# Patient Record
Sex: MALE | URBAN - METROPOLITAN AREA
[De-identification: names, ages, dates, MRNs, and addresses within clinical notes are randomized per-mention and may not be internally consistent; named-entity substitution may affect disease eponyms.]

---

## 2018-08-28 ENCOUNTER — APPOINTMENT (OUTPATIENT)
Age: 53
Setting detail: DERMATOLOGY
End: 2018-09-11

## 2018-08-28 DIAGNOSIS — L81.4 OTHER MELANIN HYPERPIGMENTATION: ICD-10-CM

## 2018-08-28 DIAGNOSIS — Z71.89 OTHER SPECIFIED COUNSELING: ICD-10-CM

## 2018-08-28 DIAGNOSIS — L91.8 OTHER HYPERTROPHIC DISORDERS OF THE SKIN: ICD-10-CM

## 2018-08-28 DIAGNOSIS — L82.1 OTHER SEBORRHEIC KERATOSIS: ICD-10-CM

## 2018-08-28 DIAGNOSIS — D22 MELANOCYTIC NEVI: ICD-10-CM

## 2018-08-28 DIAGNOSIS — D485 NEOPLASM OF UNCERTAIN BEHAVIOR OF SKIN: ICD-10-CM

## 2018-08-28 PROBLEM — D22.4 MELANOCYTIC NEVI OF SCALP AND NECK: Status: ACTIVE | Noted: 2018-08-28

## 2018-08-28 PROBLEM — D48.5 NEOPLASM OF UNCERTAIN BEHAVIOR OF SKIN: Status: ACTIVE | Noted: 2018-08-28

## 2018-08-28 PROBLEM — D22.5 MELANOCYTIC NEVI OF TRUNK: Status: ACTIVE | Noted: 2018-08-28

## 2018-08-28 PROCEDURE — OTHER OBSERVATION: OTHER

## 2018-08-28 PROCEDURE — OTHER REASSURANCE: OTHER

## 2018-08-28 PROCEDURE — 11101: CPT

## 2018-08-28 PROCEDURE — 11100: CPT

## 2018-08-28 PROCEDURE — OTHER MIPS QUALITY: OTHER

## 2018-08-28 PROCEDURE — OTHER BIOPSY BY SHAVE METHOD: OTHER

## 2018-08-28 PROCEDURE — OTHER COUNSELING: OTHER

## 2018-08-28 PROCEDURE — 99203 OFFICE O/P NEW LOW 30 MIN: CPT | Mod: 25

## 2018-08-28 ASSESSMENT — LOCATION DETAILED DESCRIPTION DERM
LOCATION DETAILED: RIGHT RIB CAGE
LOCATION DETAILED: LEFT SUPERIOR UPPER BACK
LOCATION DETAILED: LEFT MEDIAL SUPERIOR CHEST
LOCATION DETAILED: LEFT POSTERIOR SHOULDER
LOCATION DETAILED: RIGHT INFERIOR UPPER BACK
LOCATION DETAILED: LEFT INFERIOR MEDIAL UPPER BACK
LOCATION DETAILED: LEFT MID-UPPER BACK
LOCATION DETAILED: LEFT SUPERIOR LATERAL MIDBACK
LOCATION DETAILED: RIGHT MEDIAL INFERIOR CHEST
LOCATION DETAILED: RIGHT MID-UPPER BACK
LOCATION DETAILED: RIGHT INFERIOR POSTERIOR NECK
LOCATION DETAILED: LEFT INFERIOR LATERAL LOWER BACK
LOCATION DETAILED: RIGHT SUPERIOR LATERAL MIDBACK
LOCATION DETAILED: RIGHT SUPERIOR LATERAL LOWER BACK
LOCATION DETAILED: RIGHT SUPERIOR LATERAL UPPER BACK
LOCATION DETAILED: LEFT ANTERIOR MEDIAL PROXIMAL UPPER ARM
LOCATION DETAILED: RIGHT POSTERIOR SHOULDER
LOCATION DETAILED: LEFT RIB CAGE

## 2018-08-28 ASSESSMENT — LOCATION SIMPLE DESCRIPTION DERM
LOCATION SIMPLE: RIGHT LOWER BACK
LOCATION SIMPLE: CHEST
LOCATION SIMPLE: LEFT SHOULDER
LOCATION SIMPLE: RIGHT UPPER BACK
LOCATION SIMPLE: POSTERIOR NECK
LOCATION SIMPLE: RIGHT SHOULDER
LOCATION SIMPLE: LEFT UPPER ARM
LOCATION SIMPLE: ABDOMEN
LOCATION SIMPLE: LEFT UPPER BACK
LOCATION SIMPLE: LEFT LOWER BACK

## 2018-08-28 ASSESSMENT — LOCATION ZONE DERM
LOCATION ZONE: NECK
LOCATION ZONE: TRUNK
LOCATION ZONE: ARM

## 2018-08-28 NOTE — PROCEDURE: BIOPSY BY SHAVE METHOD
Curettage Text: The wound bed was treated with curettage after the biopsy was performed.
Dressing: bandage
Detail Level: Detailed
Depth Of Biopsy: dermis
Biopsy Type: H and E
Bill For Surgical Tray: no
Hemostasis: Aluminum Chloride
Silver Nitrate Text: The wound bed was treated with silver nitrate after the biopsy was performed.
Anesthesia Volume In Cc (Will Not Render If 0): 0.5
Was A Bandage Applied: Yes
Additional Anesthesia Volume In Cc (Will Not Render If 0): 0
Electrodesiccation Text: The wound bed was treated with electrodesiccation after the biopsy was performed.
Billing Type: Third-Party Bill
Type Of Destruction Used: Curettage
Wound Care: Petrolatum
Notification Instructions: Patient will be notified of biopsy results. However, patient instructed to call the office if not contacted within 2 weeks.
Electrodesiccation And Curettage Text: The wound bed was treated with electrodesiccation and curettage after the biopsy was performed.
Cryotherapy Text: The wound bed was treated with cryotherapy after the biopsy was performed.
Consent: Written consent was obtained and risks were reviewed including but not limited to scarring, infection, bleeding, scabbing, incomplete removal, nerve damage and allergy to anesthesia.
Post-Care Instructions: I reviewed with the patient in detail post-care instructions. Patient is to keep the biopsy site dry overnight, and then apply bacitracin twice daily until healed. Patient may apply hydrogen peroxide soaks to remove any crusting.
Biopsy Method: Dermablade
Anesthesia Type: 1% lidocaine with 1:100,000 epinephrine

## 2018-08-28 NOTE — PROCEDURE: MIPS QUALITY
Name And Contact Information For Health Care Proxy: Nickie Bills [phone] Name And Contact Information For Health Care Proxy: Nickie Bills [phone]

## 2024-02-16 ENCOUNTER — NEW PATIENT (OUTPATIENT)
Dept: URBAN - METROPOLITAN AREA CLINIC 98 | Facility: CLINIC | Age: 59
End: 2024-02-16

## 2024-02-16 DIAGNOSIS — H35.721: ICD-10-CM

## 2024-02-16 DIAGNOSIS — H52.13: ICD-10-CM

## 2024-02-16 PROCEDURE — 92134 CPTRZ OPH DX IMG PST SGM RTA: CPT

## 2024-02-16 PROCEDURE — 92202 OPSCPY EXTND ON/MAC DRAW: CPT

## 2024-02-16 PROCEDURE — 99204 OFFICE O/P NEW MOD 45 MIN: CPT

## 2024-02-16 ASSESSMENT — VISUAL ACUITY
OS_CC: 20/25-1
OD_CC: 20/30+2
OD_SC: CF 3FT
OS_SC: CF 3FT

## 2024-02-16 ASSESSMENT — TONOMETRY
OD_IOP_MMHG: 22
OS_IOP_MMHG: 21

## 2024-05-17 ENCOUNTER — FOLLOW UP (OUTPATIENT)
Dept: URBAN - METROPOLITAN AREA CLINIC 98 | Facility: CLINIC | Age: 59
End: 2024-05-17

## 2024-05-17 DIAGNOSIS — H40.1131: ICD-10-CM

## 2024-05-17 DIAGNOSIS — H52.13: ICD-10-CM

## 2024-05-17 DIAGNOSIS — H35.721: ICD-10-CM

## 2024-05-17 PROCEDURE — 92134 CPTRZ OPH DX IMG PST SGM RTA: CPT

## 2024-05-17 PROCEDURE — 92202 OPSCPY EXTND ON/MAC DRAW: CPT

## 2024-05-17 PROCEDURE — 92014 COMPRE OPH EXAM EST PT 1/>: CPT

## 2024-05-17 ASSESSMENT — TONOMETRY
OD_IOP_MMHG: 31
OD_IOP_MMHG: 21
OS_IOP_MMHG: 22
OS_IOP_MMHG: 25

## 2024-05-17 ASSESSMENT — VISUAL ACUITY
OD_CC: 20/30
OS_CC: 20/25-1

## 2024-06-10 ENCOUNTER — PROBLEM (OUTPATIENT)
Dept: URBAN - METROPOLITAN AREA CLINIC 98 | Facility: CLINIC | Age: 59
End: 2024-06-10

## 2024-06-10 DIAGNOSIS — H43.811: ICD-10-CM

## 2024-06-10 DIAGNOSIS — H52.13: ICD-10-CM

## 2024-06-10 DIAGNOSIS — H40.1131: ICD-10-CM

## 2024-06-10 DIAGNOSIS — H35.721: ICD-10-CM

## 2024-06-10 PROCEDURE — 92201 OPSCPY EXTND RTA DRAW UNI/BI: CPT

## 2024-06-10 PROCEDURE — 92014 COMPRE OPH EXAM EST PT 1/>: CPT

## 2024-06-10 PROCEDURE — 92134 CPTRZ OPH DX IMG PST SGM RTA: CPT

## 2024-06-10 ASSESSMENT — TONOMETRY
OS_IOP_MMHG: 20
OD_IOP_MMHG: 21

## 2024-06-10 ASSESSMENT — VISUAL ACUITY
OS_CC: 20/20
OD_CC: 20/25-2

## 2024-07-12 ENCOUNTER — FOLLOW UP (OUTPATIENT)
Dept: URBAN - METROPOLITAN AREA CLINIC 98 | Facility: CLINIC | Age: 59
End: 2024-07-12

## 2024-07-12 DIAGNOSIS — H40.1131: ICD-10-CM

## 2024-07-12 DIAGNOSIS — H52.13: ICD-10-CM

## 2024-07-12 DIAGNOSIS — H43.811: ICD-10-CM

## 2024-07-12 DIAGNOSIS — H35.721: ICD-10-CM

## 2024-07-12 PROCEDURE — 92134 CPTRZ OPH DX IMG PST SGM RTA: CPT

## 2024-07-12 PROCEDURE — 92014 COMPRE OPH EXAM EST PT 1/>: CPT

## 2024-07-12 PROCEDURE — 92201 OPSCPY EXTND RTA DRAW UNI/BI: CPT

## 2024-07-12 ASSESSMENT — TONOMETRY
OS_IOP_MMHG: 15
OD_IOP_MMHG: 17

## 2024-07-12 ASSESSMENT — VISUAL ACUITY
OD_CC: 20/30+1
OS_CC: 20/20

## 2025-01-16 ENCOUNTER — FOLLOW UP (OUTPATIENT)
Dept: URBAN - METROPOLITAN AREA CLINIC 98 | Facility: CLINIC | Age: 60
End: 2025-01-16

## 2025-01-16 DIAGNOSIS — H35.721: ICD-10-CM

## 2025-01-16 DIAGNOSIS — H43.811: ICD-10-CM

## 2025-01-16 DIAGNOSIS — H52.13: ICD-10-CM

## 2025-01-16 DIAGNOSIS — H40.1131: ICD-10-CM

## 2025-01-16 PROCEDURE — 92014 COMPRE OPH EXAM EST PT 1/>: CPT

## 2025-01-16 PROCEDURE — 92202 OPSCPY EXTND ON/MAC DRAW: CPT

## 2025-01-16 PROCEDURE — 92134 CPTRZ OPH DX IMG PST SGM RTA: CPT

## 2025-01-16 ASSESSMENT — VISUAL ACUITY
OD_CC: 20/25-1
OS_CC: 20/20

## 2025-01-16 ASSESSMENT — TONOMETRY
OD_IOP_MMHG: 16
OS_IOP_MMHG: 16
